# Patient Record
Sex: FEMALE | Race: WHITE | NOT HISPANIC OR LATINO | Employment: FULL TIME | URBAN - METROPOLITAN AREA
[De-identification: names, ages, dates, MRNs, and addresses within clinical notes are randomized per-mention and may not be internally consistent; named-entity substitution may affect disease eponyms.]

---

## 2020-11-23 ENCOUNTER — OFFICE VISIT (OUTPATIENT)
Dept: OBGYN | Facility: CLINIC | Age: 30
End: 2020-11-23
Payer: COMMERCIAL

## 2020-11-23 VITALS — SYSTOLIC BLOOD PRESSURE: 110 MMHG | WEIGHT: 145 LBS | DIASTOLIC BLOOD PRESSURE: 68 MMHG

## 2020-11-23 DIAGNOSIS — R30.0 DYSURIA: ICD-10-CM

## 2020-11-23 DIAGNOSIS — B37.31 YEAST INFECTION OF THE VAGINA: ICD-10-CM

## 2020-11-23 DIAGNOSIS — N30.01 ACUTE CYSTITIS WITH HEMATURIA: Primary | ICD-10-CM

## 2020-11-23 LAB
ALBUMIN UR-MCNC: 30 MG/DL
APPEARANCE UR: CLEAR
BACTERIA #/AREA URNS HPF: ABNORMAL /HPF
BILIRUB UR QL STRIP: NEGATIVE
COLOR UR AUTO: ABNORMAL
GLUCOSE UR STRIP-MCNC: NEGATIVE MG/DL
HGB UR QL STRIP: ABNORMAL
KETONES UR STRIP-MCNC: NEGATIVE MG/DL
LEUKOCYTE ESTERASE UR QL STRIP: ABNORMAL
NITRATE UR QL: NEGATIVE
NON-SQ EPI CELLS #/AREA URNS LPF: ABNORMAL /LPF
PH UR STRIP: 6.5 PH (ref 5–7)
RBC #/AREA URNS AUTO: ABNORMAL /HPF
SOURCE: ABNORMAL
SP GR UR STRIP: 1.01 (ref 1–1.03)
UROBILINOGEN UR STRIP-ACNC: 0.2 EU/DL (ref 0.2–1)
WBC #/AREA URNS AUTO: ABNORMAL /HPF

## 2020-11-23 PROCEDURE — 87088 URINE BACTERIA CULTURE: CPT | Performed by: ADVANCED PRACTICE MIDWIFE

## 2020-11-23 PROCEDURE — 81001 URINALYSIS AUTO W/SCOPE: CPT | Performed by: ADVANCED PRACTICE MIDWIFE

## 2020-11-23 PROCEDURE — 87086 URINE CULTURE/COLONY COUNT: CPT | Performed by: ADVANCED PRACTICE MIDWIFE

## 2020-11-23 PROCEDURE — 99203 OFFICE O/P NEW LOW 30 MIN: CPT | Performed by: ADVANCED PRACTICE MIDWIFE

## 2020-11-23 RX ORDER — PHENAZOPYRIDINE HYDROCHLORIDE 200 MG/1
200 TABLET, FILM COATED ORAL 3 TIMES DAILY PRN
Qty: 6 TABLET | Refills: 0 | Status: SHIPPED | OUTPATIENT
Start: 2020-11-23 | End: 2021-04-08

## 2020-11-23 RX ORDER — NORGESTIMATE AND ETHINYL ESTRADIOL 0.25-0.035
1 KIT ORAL
COMMUNITY
Start: 2020-10-08 | End: 2021-12-15

## 2020-11-23 RX ORDER — CIPROFLOXACIN 500 MG/1
500 TABLET, FILM COATED ORAL 2 TIMES DAILY
Qty: 14 TABLET | Refills: 0 | Status: SHIPPED | OUTPATIENT
Start: 2020-11-23 | End: 2021-04-08

## 2020-11-23 RX ORDER — HYDROXYZINE PAMOATE 25 MG/1
CAPSULE ORAL
COMMUNITY
Start: 2020-10-08 | End: 2022-03-17

## 2020-11-23 RX ORDER — FLUCONAZOLE 150 MG/1
150 TABLET ORAL ONCE
Qty: 1 TABLET | Refills: 0 | Status: SHIPPED | OUTPATIENT
Start: 2020-11-23 | End: 2020-11-23

## 2020-11-23 RX ORDER — CITALOPRAM HYDROBROMIDE 10 MG/1
TABLET ORAL
COMMUNITY
Start: 2020-10-08 | End: 2021-12-15

## 2020-11-23 SDOH — HEALTH STABILITY: MENTAL HEALTH: HOW OFTEN DO YOU HAVE 6 OR MORE DRINKS ON ONE OCCASION?: NOT ASKED

## 2020-11-23 SDOH — HEALTH STABILITY: MENTAL HEALTH: HOW OFTEN DO YOU HAVE A DRINK CONTAINING ALCOHOL?: NOT ASKED

## 2020-11-23 SDOH — HEALTH STABILITY: MENTAL HEALTH: HOW MANY STANDARD DRINKS CONTAINING ALCOHOL DO YOU HAVE ON A TYPICAL DAY?: NOT ASKED

## 2020-11-23 NOTE — PROGRESS NOTES
"Chief Complaint: painful urination    HPI: Edna noticed painful urination and urgency starting yesterday, but more marked today.  Expresses feeling like she needs to urinate frequently and also notes pain with each void, to the point where she sometimes feels nauseated.  Also having diarrhea with each void.  Notes hx of IBS and \"bowel issues\".  Denies vomiting.  Took temperature at home and was not febrile.  Denies back pain.  Did feel cold this morning, but better now.  She took a shot of cranberry juice from the pharmacy today, has been drinking lots of water, and also purchased a strip from the pharmacy that assesses for UTI and it was positive.  She had a UTI about 3 weeks ago and was empirically treated with Keflex.  UA and UC were not done as it was a tele health visit.  Notes she seems to get UTI after IC.  Reviewed urinating after IC and washes well.  We briefly discussed that some women take prophylactic abx with each act of IC, some also take cranberry pills and probiotic to help prevent UTI.  LMP is 2020, so hard to tell if there is blood in the urine.    Review of Systems - Negative except noted in HPI      Patient's last menstrual period was 2020.  OB History    Para Term  AB Living   0 0 0 0 0 0   SAB TAB Ectopic Multiple Live Births   0 0 0 0 0     Past Medical History:   Diagnosis Date     Anxiety and depression      Irritable bowel syndrome with diarrhea      Past Surgical History:   Procedure Laterality Date     NO HISTORY OF SURGERY       Family History   Problem Relation Age of Onset     No Known Problems Mother      No Known Problems Father      Crohn's Disease Sister      No Known Problems Brother      Heart Disease Maternal Grandmother      Pancreatic Cancer Paternal Grandmother      Emphysema Paternal Grandfather      Social History     Socioeconomic History     Marital status: Single     Spouse name: Not on file     Number of children: Not on file     Years of " education: Not on file     Highest education level: Not on file   Occupational History     Not on file   Social Needs     Financial resource strain: Not on file     Food insecurity     Worry: Not on file     Inability: Not on file     Transportation needs     Medical: Not on file     Non-medical: Not on file   Tobacco Use     Smoking status: Never Smoker     Smokeless tobacco: Never Used   Substance and Sexual Activity     Alcohol use: Yes     Comment: socially     Drug use: Never     Sexual activity: Yes     Partners: Male     Birth control/protection: Pill   Lifestyle     Physical activity     Days per week: Not on file     Minutes per session: Not on file     Stress: Not on file   Relationships     Social connections     Talks on phone: Not on file     Gets together: Not on file     Attends Congregational service: Not on file     Active member of club or organization: Not on file     Attends meetings of clubs or organizations: Not on file     Relationship status: Not on file     Intimate partner violence     Fear of current or ex partner: Not on file     Emotionally abused: Not on file     Physically abused: Not on file     Forced sexual activity: Not on file   Other Topics Concern     Not on file   Social History Narrative     Not on file       Current Outpatient Medications:      ciprofloxacin (CIPRO) 500 MG tablet, Take 1 tablet (500 mg) by mouth 2 times daily, Disp: 14 tablet, Rfl: 0     citalopram (CELEXA) 10 MG tablet, TK 1 T PO EACH MORNING, Disp: , Rfl:      fluconazole (DIFLUCAN) 150 MG tablet, Take 1 tablet (150 mg) by mouth once for 1 dose, Disp: 1 tablet, Rfl: 0     hydrOXYzine (VISTARIL) 25 MG capsule, Take 1-2 capsules by mouth up to three times a day as needed for anxiety, Disp: , Rfl:      norgestimate-ethinyl estradiol (ORTHO-CYCLEN) 0.25-35 MG-MCG tablet, Take 1 tablet by mouth, Disp: , Rfl:      phenazopyridine (PYRIDIUM) 200 MG tablet, Take 1 tablet (200 mg) by mouth 3 times daily as needed for  irritation, Disp: 6 tablet, Rfl: 0  Allergies   Allergen Reactions     Erythromycin Rash     As a child (unsure if it was azithromycin or erythromycin)         Objective:  Exam:  Constitutional: healthy, alert, no distress and cooperative  Head: Normocephalic. No masses, lesions, tenderness or abnormalities  Cardiovascular: PMI normal. No lifts, heaves, or thrills. RRR. No murmurs, clicks gallops or rub  Respiratory: negative findings: normal respiratory rate and rhythm, lungs clear to auscultation  Gastrointestinal: Abdomen soft, non-tender. BS normal. No masses, organomegaly  : mild suprapubic tenderness  Musculoskeletal: extremities normal- no gross deformities noted and gait normal  Neurologic: negative findings: cranial nerves 2-12 intact  Psychiatric: mentation appears normal and affect normal/bright      Assessment:  Acute cystitis    Plan:  UA/UC  Ciprofloxacin 500mg BID x 7 days    Edna Grimm CNM

## 2020-11-23 NOTE — NURSING NOTE
Chief Complaint   Patient presents with     UTI     x3wks, dysuria, frequency       Initial /68 (BP Location: Left arm, Cuff Size: Adult Regular)   Wt 65.8 kg (145 lb)   LMP 2020  There is no height or weight on file to calculate BMI.  BP completed using cuff size: regular    Questioned patient about current smoking habits.  Pt. has never smoked.        Britni Church, CMA

## 2020-11-26 LAB
BACTERIA SPEC CULT: ABNORMAL
BACTERIA SPEC CULT: ABNORMAL
Lab: ABNORMAL
SPECIMEN SOURCE: ABNORMAL

## 2020-11-27 ENCOUNTER — TELEPHONE (OUTPATIENT)
Facility: CLINIC | Age: 30
End: 2020-11-27

## 2020-11-27 DIAGNOSIS — R82.71 GBS BACTERIURIA: Primary | ICD-10-CM

## 2020-11-27 RX ORDER — AMOXICILLIN 500 MG/1
500 CAPSULE ORAL 3 TIMES DAILY
Qty: 15 CAPSULE | Refills: 0 | Status: SHIPPED | OUTPATIENT
Start: 2020-11-27 | End: 2021-04-08

## 2020-11-27 NOTE — TELEPHONE ENCOUNTER
Please notified Edna that I have sent in a prescription for a medication that will better treat GBS. Thanks.     Copied from routing comment

## 2020-12-17 ENCOUNTER — TELEPHONE (OUTPATIENT)
Facility: CLINIC | Age: 30
End: 2020-12-17

## 2020-12-17 DIAGNOSIS — B96.89 BACTERIAL VAGINITIS: Primary | ICD-10-CM

## 2020-12-17 DIAGNOSIS — R82.71 GBS BACTERIURIA: ICD-10-CM

## 2020-12-17 DIAGNOSIS — N76.0 BACTERIAL VAGINITIS: Primary | ICD-10-CM

## 2020-12-17 LAB
ALBUMIN UR-MCNC: NEGATIVE MG/DL
APPEARANCE UR: CLEAR
BILIRUB UR QL STRIP: NEGATIVE
COLOR UR AUTO: YELLOW
GLUCOSE UR STRIP-MCNC: NEGATIVE MG/DL
HGB UR QL STRIP: NEGATIVE
KETONES UR STRIP-MCNC: NEGATIVE MG/DL
LEUKOCYTE ESTERASE UR QL STRIP: NEGATIVE
NITRATE UR QL: NEGATIVE
PH UR STRIP: 7 PH (ref 5–7)
RBC #/AREA URNS AUTO: NORMAL /HPF
SOURCE: NORMAL
SP GR UR STRIP: 1.02 (ref 1–1.03)
SPECIMEN SOURCE: ABNORMAL
UROBILINOGEN UR STRIP-ACNC: 0.2 EU/DL (ref 0.2–1)
WBC #/AREA URNS AUTO: NORMAL /HPF
WET PREP SPEC: ABNORMAL

## 2020-12-17 PROCEDURE — 87210 SMEAR WET MOUNT SALINE/INK: CPT | Performed by: ADVANCED PRACTICE MIDWIFE

## 2020-12-17 PROCEDURE — 87086 URINE CULTURE/COLONY COUNT: CPT | Performed by: ADVANCED PRACTICE MIDWIFE

## 2020-12-17 PROCEDURE — 81001 URINALYSIS AUTO W/SCOPE: CPT | Performed by: ADVANCED PRACTICE MIDWIFE

## 2020-12-17 NOTE — TELEPHONE ENCOUNTER
Discussed with Sonja.  UA with UC and self swab wet prep ordered.    Pt advised.  Scheduled.    Tianna Burgos RN

## 2020-12-17 NOTE — TELEPHONE ENCOUNTER
"Pt calling regarding her recent UTI (saw Edna on 11/23).  She took abx as Rx'd.  Culture showed GBS.  Still noting a slight increase in frequency with urination.  Also states vaginal area feels \"off.\"    States her partner was also seen after her symptoms and the provider mentioned something about \"trich.\"    Wants to be seen, but no openings tomorrow.  Can we have her do lab only for UA and self swab wet prep?    Tianna Burgos RN    "

## 2020-12-18 LAB
BACTERIA SPEC CULT: NO GROWTH
Lab: NORMAL
SPECIMEN SOURCE: NORMAL

## 2020-12-18 RX ORDER — METRONIDAZOLE 7.5 MG/G
1 GEL VAGINAL DAILY
Qty: 25 G | Refills: 0 | Status: SHIPPED | OUTPATIENT
Start: 2020-12-18 | End: 2020-12-23

## 2020-12-18 NOTE — TELEPHONE ENCOUNTER
Called and informed about wet prep + for clue cells.  Reviewed options for treatment and she chooses metronidazole gel.  Discussed no alcohol use during medication administration and to either avoid intercourse, or use condoms while being treated.  She mentions that her boyfriend was seen in clinic and had a culture.  Said he is being treated for trichomoniasis, though he did not test positive for it.  We discussed that trichomoniasis is an STD, but she is negative for this.  Prescription sent to preferred pharmacy.    Edna Grimm CNM

## 2020-12-18 NOTE — TELEPHONE ENCOUNTER
Pt calling today for results.  Please see previous note in this thread from yesterday.  Please see UA and wet prep that was ordered under Sonja yesterday.    Tianna Burgos RN

## 2021-01-09 ENCOUNTER — HEALTH MAINTENANCE LETTER (OUTPATIENT)
Age: 31
End: 2021-01-09

## 2021-04-08 ENCOUNTER — OFFICE VISIT (OUTPATIENT)
Dept: OBGYN | Facility: CLINIC | Age: 31
End: 2021-04-08
Payer: COMMERCIAL

## 2021-04-08 VITALS
SYSTOLIC BLOOD PRESSURE: 100 MMHG | DIASTOLIC BLOOD PRESSURE: 68 MMHG | WEIGHT: 159 LBS | BODY MASS INDEX: 26.49 KG/M2 | HEIGHT: 65 IN

## 2021-04-08 DIAGNOSIS — N76.0 BACTERIAL VAGINOSIS: ICD-10-CM

## 2021-04-08 DIAGNOSIS — N86 CERVICAL ECTROPION: ICD-10-CM

## 2021-04-08 DIAGNOSIS — N93.0 BLEEDING AFTER INTERCOURSE: Primary | ICD-10-CM

## 2021-04-08 DIAGNOSIS — B96.89 BACTERIAL VAGINOSIS: ICD-10-CM

## 2021-04-08 DIAGNOSIS — B37.31 CANDIDIASIS OF VAGINA: ICD-10-CM

## 2021-04-08 DIAGNOSIS — Z83.49 FAMILY HISTORY OF THYROID PROBLEM: ICD-10-CM

## 2021-04-08 LAB
HCG UR QL: NEGATIVE
SPECIMEN SOURCE: ABNORMAL
TSH SERPL DL<=0.005 MIU/L-ACNC: 1.2 MU/L (ref 0.4–4)
WET PREP SPEC: ABNORMAL

## 2021-04-08 PROCEDURE — 99213 OFFICE O/P EST LOW 20 MIN: CPT | Performed by: ADVANCED PRACTICE MIDWIFE

## 2021-04-08 PROCEDURE — 87591 N.GONORRHOEAE DNA AMP PROB: CPT | Performed by: ADVANCED PRACTICE MIDWIFE

## 2021-04-08 PROCEDURE — 87491 CHLMYD TRACH DNA AMP PROBE: CPT | Performed by: ADVANCED PRACTICE MIDWIFE

## 2021-04-08 PROCEDURE — 87210 SMEAR WET MOUNT SALINE/INK: CPT | Performed by: ADVANCED PRACTICE MIDWIFE

## 2021-04-08 PROCEDURE — 36415 COLL VENOUS BLD VENIPUNCTURE: CPT | Performed by: ADVANCED PRACTICE MIDWIFE

## 2021-04-08 PROCEDURE — 81025 URINE PREGNANCY TEST: CPT | Performed by: ADVANCED PRACTICE MIDWIFE

## 2021-04-08 PROCEDURE — 84443 ASSAY THYROID STIM HORMONE: CPT | Performed by: ADVANCED PRACTICE MIDWIFE

## 2021-04-08 RX ORDER — FLUCONAZOLE 150 MG/1
150 TABLET ORAL ONCE
Qty: 1 TABLET | Refills: 0 | Status: SHIPPED | OUTPATIENT
Start: 2021-04-08 | End: 2021-04-08

## 2021-04-08 RX ORDER — METRONIDAZOLE 7.5 MG/G
1 GEL VAGINAL DAILY
Qty: 25 G | Refills: 0 | Status: SHIPPED | OUTPATIENT
Start: 2021-04-08 | End: 2021-04-13

## 2021-04-08 ASSESSMENT — MIFFLIN-ST. JEOR: SCORE: 1432.1

## 2021-04-08 NOTE — PATIENT INSTRUCTIONS
"?Noninfectious - Noninfectious causes are numerous (table 1). Gynecologic etiologies are the most common causes of postcoital bleeding, and include [4,5]:    Cervical ectropion - 34 percent.   Cervical ectropion is the presence of columnar epithelium on the ectocervix and is commonly found in postpubertal adolescents, pregnant individuals, and those using estrogen-containing contraceptives (eg, oral estrogen-progestin pills, vaginal ring, or transdermal patch) (picture 1) [6]. (See \"Benign cervical lesions and congenital anomalies of the cervix\", section on 'Ectropion'.)    "

## 2021-04-08 NOTE — NURSING NOTE
"Chief Complaint   Patient presents with     Vaginal Bleeding     bleeding with IC       Initial /68 (BP Location: Left arm, Patient Position: Chair, Cuff Size: Adult Regular)   Ht 1.651 m (5' 5\")   Wt 72.1 kg (159 lb)   LMP 2021 (Approximate)   Breastfeeding No   BMI 26.46 kg/m   Estimated body mass index is 26.46 kg/m  as calculated from the following:    Height as of this encounter: 1.651 m (5' 5\").    Weight as of this encounter: 72.1 kg (159 lb).  BP completed using cuff size: regular    Questioned patient about current smoking habits.  Pt. has never smoked.          The following HM Due: NONE  Jolly Skinner CMA    "

## 2021-04-08 NOTE — PROGRESS NOTES
upt    SUBJECTIVE:                                                   Edna Ayala is a 30 year old who presents to clinic today for the following health issue(s):  Patient presents with:  Vaginal Bleeding: bleeding with IC      HPI:  Bleeding with intercourse x2-3 wwks  Reports bleeding is bright red, similar to period blood   Light tampon's worth of bleeding immediately after intercourse   Reports she has been on ortho-cyclen since 9th grade.;  She denies any new sexual partners or concerns for STDs. She has been with her current partner x1 yr.   She also reports she desires to have her thyroid level checked due to a family history of thyroid abnormalities.     Patient's last menstrual period was 2021 (approximate).  Menstrual History: frequency: every 28 days  Patient is sexually active  .  Using oral contraceptives for contraception.     STI infx testing offered:  Accepted    Last PHQ-9 score on record = No flowsheet data found.  Last GAD7 score on record = No flowsheet data found.  Alcohol Score = 0    Problem list and histories reviewed & adjusted, as indicated.  Additional history: as documented.    There is no problem list on file for this patient.    Past Surgical History:   Procedure Laterality Date     NO HISTORY OF SURGERY        Social History     Tobacco Use     Smoking status: Never Smoker     Smokeless tobacco: Never Used   Substance Use Topics     Alcohol use: Yes     Comment: socially      Problem (# of Occurrences) Relation (Name,Age of Onset)    Crohn's Disease (1) Sister    Emphysema (1) Paternal Grandfather    Heart Disease (1) Maternal Grandmother    No Known Problems (3) Mother, Father, Brother    Pancreatic Cancer (1) Paternal Grandmother            Current Outpatient Medications   Medication Sig     citalopram (CELEXA) 10 MG tablet TK 1 T PO EACH MORNING     fluconazole (DIFLUCAN) 150 MG tablet Take 1 tablet (150 mg) by mouth once for 1 dose     hydrOXYzine (VISTARIL) 25 MG  "capsule Take 1-2 capsules by mouth up to three times a day as needed for anxiety     metroNIDAZOLE (METROGEL) 0.75 % vaginal gel Place 1 applicator (5 g) vaginally daily for 5 days     norgestimate-ethinyl estradiol (ORTHO-CYCLEN) 0.25-35 MG-MCG tablet Take 1 tablet by mouth     No current facility-administered medications for this visit.      Allergies   Allergen Reactions     Erythromycin Rash     As a child (unsure if it was azithromycin or erythromycin)         ROS:  CONSTITUTIONAL: NEGATIVE for fever, chills, change in weight  INTEGUMENTARU/SKIN: NEGATIVE for worrisome rashes, moles or lesions  EYES: NEGATIVE for vision changes or irritation  ENT: NEGATIVE for ear, mouth and throat problems  RESP: NEGATIVE for significant cough or SOB  BREAST: NEGATIVE for masses, tenderness or discharge  CV: NEGATIVE for chest pain, palpitations or peripheral edema  GI: NEGATIVE for nausea, abdominal pain, heartburn, or change in bowel habits  : NEGATIVE for unusual urinary.. Periods are regular. Positive for bleeding with intercourse.   MUSCULOSKELETAL: NEGATIVE for significant arthralgias or myalgia  NEURO: NEGATIVE for weakness, dizziness or paresthesias  PSYCHIATRIC: NEGATIVE for changes in mood or affect    OBJECTIVE:     /68 (BP Location: Left arm, Patient Position: Chair, Cuff Size: Adult Regular)   Ht 1.651 m (5' 5\")   Wt 72.1 kg (159 lb)   LMP 03/16/2021 (Approximate)   Breastfeeding No   BMI 26.46 kg/m    Body mass index is 26.46 kg/m .    PHYSICAL EXAM:  Constitutional:  Appearance: Well nourished, well developed alert, in no acute distress  Skin: General Inspection:  No rashes present, no lesions present, no areas of discoloration.  Neurologic:  Mental Status:  Oriented X3.  Normal strength and tone, sensory exam grossly normal, mentation intact and speech normal.    Psychiatric:  Mentation appears normal and affect normal/bright.  Pelvic Exam:  External Genitalia:     Normal appearance for age, no " discharge present, no tenderness present, no inflammatory lesions present, color normal  Vagina:     Normal vaginal vault without central or paravaginal defects, no discharge present, no inflammatory lesions present, no masses present  Cervix:     Appearance healthy, no lesions present, nontender to palpation, no bleeding present   Cervical ectropion present, scant amount of blood noted with q-tip test  Uterus:     Uterus: firm, normal sized and nontender  Adnexa:     No adnexal tenderness present, no adnexal masses present  Perineum:     Perineum within normal limits, no evidence of trauma, no rashes or skin lesions present  Anus:     Anus within normal limits, no hemorrhoids present  Inguinal Lymph Nodes:     No lymphadenopathy present  Pubic Hair:     Normal pubic hair distribution for age  Genitalia and Groin:     No rashes present, no lesions present, no areas of discoloration, no masses present       In-Clinic Test Results:  Results for orders placed or performed in visit on 04/08/21 (from the past 24 hour(s))   Wet prep    Specimen: Vagina   Result Value Ref Range    Specimen Description Vagina     Wet Prep No Trichomonas seen     Wet Prep Clue cells seen  Many   (A)     Wet Prep Yeast seen  Few   (A)     Wet Prep WBC'S seen  Many      HCG Qual, Urine ([id number])   Result Value Ref Range    HCG Qual Urine Negative NEG^Negative       ASSESSMENT/PLAN:                                                      1. (N93.0) Bleeding after intercourse  (primary encounter diagnosis)  Plan: US Pelvic Complete with Transvaginal, HCG Qual,        Urine ([id number]), Wet prep, NEISSERIA GONORRHOEA        PCR, CHLAMYDIA TRACHOMATIS PCR        2. (Z83.49) Family history of thyroid problem  Plan: TSH with free T4 reflex         3. (N76.0,  B96.89) Bacterial vaginosis  Plan: metroNIDAZOLE (METROGEL) 0.75 % vaginal gel  - Wet prep pos for clue cells  - Discussed this could be the cause of her bleeding with intercourse  - Treat with  metrogel  - Of note, pt was treated for BV in 12/2020 with metrogel with good relief  - We discussed causes of BV and how to prevent recurrence. Encouraged probiotic use, using condoms during treatment.   - Can try vaginal boric acid 600mg nightly x30 nights after she is done with her Flagyl  - Discussed option for suppressive therapy if she has another documented case of BV this year    4. (B37.3) Candidiasis of vagina  Plan: fluconazole (DIFLUCAN) 150 MG tablet  - Wet prep pos for yeast    5. (N86) Cervical ectropion  - Discussed cervical ectropion is common among VERNON users and is benign in nature, but is a common cause of bleeding with intercourse  - Discussed it often resolves after switching to a progesterone-only BC method    Consider pelvic US if bleeding does not resolve after BV/Yeast are treated, although I do think the likely cause is cervical ectropion    ADAM Carson, CNM

## 2021-04-09 LAB
C TRACH DNA SPEC QL NAA+PROBE: NEGATIVE
N GONORRHOEA DNA SPEC QL NAA+PROBE: NEGATIVE
SPECIMEN SOURCE: NORMAL
SPECIMEN SOURCE: NORMAL

## 2021-10-11 ENCOUNTER — HEALTH MAINTENANCE LETTER (OUTPATIENT)
Age: 31
End: 2021-10-11

## 2021-12-15 ENCOUNTER — OFFICE VISIT (OUTPATIENT)
Dept: FAMILY MEDICINE | Facility: CLINIC | Age: 31
End: 2021-12-15
Payer: COMMERCIAL

## 2021-12-15 VITALS
RESPIRATION RATE: 14 BRPM | DIASTOLIC BLOOD PRESSURE: 68 MMHG | HEIGHT: 65 IN | BODY MASS INDEX: 26.49 KG/M2 | WEIGHT: 159 LBS | OXYGEN SATURATION: 99 % | TEMPERATURE: 98.1 F | HEART RATE: 58 BPM | SYSTOLIC BLOOD PRESSURE: 104 MMHG

## 2021-12-15 DIAGNOSIS — N89.8 VAGINAL ODOR: Primary | ICD-10-CM

## 2021-12-15 DIAGNOSIS — N89.8 VAGINAL DISCHARGE: ICD-10-CM

## 2021-12-15 LAB
CLUE CELLS: ABNORMAL
TRICHOMONAS, WET PREP: ABNORMAL
WBC'S/HIGH POWER FIELD, WET PREP: ABNORMAL
YEAST, WET PREP: ABNORMAL

## 2021-12-15 PROCEDURE — 99203 OFFICE O/P NEW LOW 30 MIN: CPT | Performed by: PHYSICIAN ASSISTANT

## 2021-12-15 PROCEDURE — 87210 SMEAR WET MOUNT SALINE/INK: CPT | Performed by: PHYSICIAN ASSISTANT

## 2021-12-15 RX ORDER — CLINDAMYCIN PHOSPHATE 10 MG/G
GEL TOPICAL 2 TIMES DAILY
COMMUNITY
Start: 2021-10-19 | End: 2022-03-17

## 2021-12-15 RX ORDER — METRONIDAZOLE 7.5 MG/G
GEL VAGINAL
COMMUNITY
Start: 2021-10-19 | End: 2022-03-17

## 2021-12-15 ASSESSMENT — MIFFLIN-ST. JEOR: SCORE: 1427.1

## 2021-12-15 NOTE — PROGRESS NOTES
"  Assessment & Plan     Diagnoses and all orders for this visit:    Vaginal odor  -     Wet prep - Clinic Collect    Vaginal discharge  -     Wet prep - Clinic Collect      32 yo female with recurrent hx of BV previously tried on oral and vaginal therapy. Recently seen by PCP for same and advised to use metrogel 2x weekly for next few months for prevention. Did notice initial improvement, but stopped after 2 weeks as wondering if there's alternative options that don't include persistent vaginal medication. Reviewed with her that I agree with previous provider's assessment, but that vaginal or oral clindamycin would be alternative options. Risks of systemic abx therapy reviewed including possibility of c diff. Did repeat her wet prep today due to possible early recurrence of symptoms, but not \"full on symptoms\" and will notify her via [employer] of results once available. Encouraged to consult with OB//GYN specialist moving forward as well. Patient in agreement with plan.     Return in about 1 month (around 1/15/2022) for ob/gyn.    CARLOS MANUEL Smith [facility]    Issac Bishop is a 31 year old who presents for the following health issues:    Daughter of one of our wonderful Midwife colleagues.    History of Present Illness       She eats 2-3 servings of fruits and vegetables daily.She consumes 0 sweetened beverage(s) daily.She exercises with enough effort to increase her heart rate 10 to 19 minutes per day.  She exercises with enough effort to increase her heart rate 3 or less days per week.   She is taking medications regularly.     Vaginal Symptoms - hx of recurrent BV x1 yr. Estimates at least 6 episodes. Feels likely has had it more than this, but just deals with it. Feels \"acidic\", has odor, some itching and sometimes pain during intercourse. Has also had bleeding during intercourse as well. Saw her primary care provider regarding this and one of the clinical nurse midwife for " "this who was a fill in at her mom's clinic prior to this.  Was prescribed 7 day course or oral metronidazole followed by vaginal metronidazole 2x weekly for prevention for several months. Has been using this, but wanted a second opinion as \"makes me feel gross\" and wonders if there are alternative options. Did this for 2 weeks then quit.   Doesn't feel like it's \"full on symptoms\" right now, but has milder symptoms that makes her feel like it could be coming back.     Not on contraception for the past 6 months. Not trying to conceive, but has a partner that is significantly older than her so is ok with the possibility of pregnancy. Dating for a year if not more. \"Would be fine if it happened\".  Both special Ed     Onset/Duration: previously seen by multiple providers - has had antibiotics   Description:  Vaginal Discharge: white clear   Itching (Pruritis): YES  Burning sensation:  no  Odor: YES  Accompanying Signs & Symptoms:  Urinary symptoms: no  Abdominal pain: no  Fever: no  History:   Sexually active: YES  New Partner: no  Possibility of Pregnancy:  Would like to discuss fertility - has not been trying to conceive. No use of ovulation kits. Previously normal TSH screening this year.  Recent antibiotic use: YES-  For BV     Current Outpatient Medications   Medication     aluminum chloride (DRYSOL) 20 % external solution     clindamycin (CLINDAMAX) 1 % external gel     metroNIDAZOLE (METROGEL) 0.75 % vaginal gel     hydrOXYzine (VISTARIL) 25 MG capsule     No current facility-administered medications for this visit.        Allergies   Allergen Reactions     Erythromycin Rash     As a child (unsure if it was azithromycin or erythromycin)         Review of Systems   Constitutional, HEENT, cardiovascular, pulmonary, gi and gu systems are negative, except as otherwise noted.      Objective    /68   Pulse 58   Temp 98.1  F (36.7  C) (Tympanic)   Resp 14   Ht 1.651 m (5' 5\")   Wt 72.1 kg (159 lb)   LMP " 12/07/2021   SpO2 99%   BMI 26.46 kg/m    Body mass index is 26.46 kg/m .  Physical Exam   GENERAL: healthy, alert and no distress  RESP: lungs clear to auscultation - no rales, rhonchi or wheezes  CV: regular rate and rhythm, normal S1 S2, no S3 or S4, no murmur, click or rub, no peripheral edema and peripheral pulses strong  ABDOMEN: soft, nontender, no hepatosplenomegaly, no masses and bowel sounds normal   (female): normal female external genitalia, normal urethral meatus, vaginal mucosa, normal cervix/adnexa/uterus without masses. Perhaps minimal clear to white discharge, but no apparent malodor.

## 2021-12-16 NOTE — RESULT ENCOUNTER NOTE
Dear Edna,      Your recent test results are noted below:    Wet prep was negative :) As you stated, perhaps you don't have a true episode. If it recurs, can consider clindamycin or following up with OB/GYN as we discussed.    For additional lab test information, labtestsonline.org is an excellent reference. Please contact the clinic at [phone] with any further questions or concerns.    Sincerely,      Allyn Asher PA-C  [facility]

## 2022-01-30 ENCOUNTER — HEALTH MAINTENANCE LETTER (OUTPATIENT)
Age: 32
End: 2022-01-30

## 2022-03-16 ENCOUNTER — TELEPHONE (OUTPATIENT)
Dept: FAMILY MEDICINE | Facility: CLINIC | Age: 32
End: 2022-03-16
Payer: COMMERCIAL

## 2022-03-17 ENCOUNTER — PRENATAL OFFICE VISIT (OUTPATIENT)
Dept: NURSING | Facility: CLINIC | Age: 32
End: 2022-03-17
Payer: COMMERCIAL

## 2022-03-17 VITALS — WEIGHT: 163 LBS | HEIGHT: 65 IN | BODY MASS INDEX: 27.16 KG/M2

## 2022-03-17 DIAGNOSIS — O36.80X0 PREGNANCY WITH INCONCLUSIVE FETAL VIABILITY: Primary | ICD-10-CM

## 2022-03-17 DIAGNOSIS — Z13.79 GENETIC SCREENING: ICD-10-CM

## 2022-03-17 DIAGNOSIS — O09.91 SUPERVISION OF HIGH RISK PREGNANCY IN FIRST TRIMESTER: ICD-10-CM

## 2022-03-17 PROBLEM — O09.90 SUPERVISION OF HIGH-RISK PREGNANCY: Status: ACTIVE | Noted: 2022-03-17

## 2022-03-17 PROCEDURE — 99207 PR NO CHARGE NURSE ONLY: CPT

## 2022-03-17 RX ORDER — PYRIDOXINE HCL (VITAMIN B6) 50 MG
50 TABLET ORAL DAILY
COMMUNITY
End: 2022-04-11

## 2022-03-17 NOTE — PROGRESS NOTES
SUBJECTIVE:     HPI:    This is a 31 year old female patient,  who presents for her first obstetrical visit.    JASMIN: 10/13/2022, by Last Menstrual Period.  She is 10w0d weeks.  Her cycles are irregular.  Her last menstrual period was light spotting.   Since her LMP, she has experienced  nausea.    Additional History: first pregnancy    Have you travelled during the pregnancy?No  Have your sexual partner(s) travelled during the pregnancy?No    HISTORY:   Planned Pregnancy: No  Marital Status: Single  Occupation: Teacher  Living in Household: Alone    Past History:  Her past medical history   Past Medical History:   Diagnosis Date     Anxiety and depression      Generalized hyperhidrosis      Irritable bowel syndrome with diarrhea    .      She has a history of  anx/dep-off meds for approx 6 month, IBS    Since her last LMP she denies use of alcohol, tobacco and street drugs.    Past medical, surgical, social and family history were reviewed and updated in [facility].      Current Outpatient Medications   Medication     doxylamine (UNISOM) 25 MG TABS tablet     Prenat w/o [id number] (PNV-DHA PO)     pyridOXINE (VITAMIN B-6) 50 MG tablet     No current facility-administered medications for this visit.       ROS:   12 point review of systems negative other than symptoms noted below or in the HPI.  Gastrointestinal: Nausea    Nurse phone visit completed. Prenatal book and folder containing standard educational hand-outs and brochures will be given at the next visit to patient. Information in folder reviewed over the phone. Questions answered. Information given on optional screening available to assess chromosomal anomalies. Pt desires NIPS. Pt advised to call the clinic if she has any questions or concerns related to her pregnancy. Prenatal labs future ordered.  Bella Murray RN on 3/17/2022 at 2:35 PM        No results found for: PAP  PHQ-9 score:    PHQ 3/17/2022   PHQ-9 Total Score 6   Q9: Thoughts of  "better off dead/self-harm past 2 weeks Not at all               AZUL-7 SCORE 3/17/2022   Total Score 5 (mild anxiety)   Total Score 5             Patient supplied answers from flow sheet for:  Prenatal OB Questionnaire.  Past Medical History  Have you ever recieved care for your mental health? : (!) Yes  Have you ever been in a major accident or suffered serious trauma?: No  Within the last year, has anyone hit, slapped, kicked or otherwise hurt you?: No  In the last year, has anyone forced you to have sex when you didn't want to?: No    Past Medical History 2   Have you ever received a blood transfusion?: No  Would you accept a blood transfusion if was medically recommended?: Yes  Does anyone in your home smoke?: No   Is your blood type Rh negative?: No  Have you ever ?: No  Have you been hospitalized for a nonsurgical reason excluding normal delivery?: No  Have you ever had an abnormal pap smear?: No    Past Medical History (Continued)  Do you have a history of abnormalities of the uterus?: No  Did your mother take MELVI or any other hormones when she was pregnant with you?: No  Do you have any other problems we have not asked about which you feel may be important to this pregnancy?: No                   OBJECTIVE:     EXAM:  Ht 1.651 m (5' 5\")   Wt 73.9 kg (163 lb)   LMP 01/06/2022   BMI 27.12 kg/m   Body mass index is 27.12 kg/m .      "

## 2022-03-23 NOTE — PROGRESS NOTES
SUBJECTIVE:      HPI:     This is a 31 year old female patient,  who presents for her first obstetrical visit.     JASMIN: 10/13/2022, by Last Menstrual Period.  She is 10w0d weeks.  Her cycles are irregular.  Her last menstrual period was light spotting.   Since her LMP, she has experienced  nausea.     Additional History: first pregnancy     Have you travelled during the pregnancy?No  Have your sexual partner(s) travelled during the pregnancy?No     HISTORY:   Planned Pregnancy: No  Marital Status: Single  Occupation: Teacher  Living in Household: Alone     Past History:  Her past medical history   Past Medical History        Past Medical History:   Diagnosis Date     Anxiety and depression       Generalized hyperhidrosis       Irritable bowel syndrome with diarrhea        .       She has a history of  anx/dep-off meds for approx 6 month, IBS     Since her last LMP she denies use of alcohol, tobacco and street drugs.     Past medical, surgical, social and family history were reviewed and updated in EPIC.            Current Outpatient Medications   Medication     doxylamine (UNISOM) 25 MG TABS tablet     Prenat w/o R-AA-Qhcganw-FA-DHA (PNV-DHA PO)     pyridOXINE (VITAMIN B-6) 50 MG tablet      No current facility-administered medications for this visit.         ROS:   12 point review of systems negative other than symptoms noted below or in the HPI.  Gastrointestinal: Nausea     Nurse phone visit completed. Prenatal book and folder containing standard educational hand-outs and brochures will be given at the next visit to patient. Information in folder reviewed over the phone. Questions answered. Information given on optional screening available to assess chromosomal anomalies. Pt desires NIPS. Pt advised to call the clinic if she has any questions or concerns related to her pregnancy. Prenatal labs future ordered.  Bella Murray RN on 3/17/2022 at 2:35 PM           No results found for: PAP  PHQ-9 score:   "  PHQ 3/17/2022   PHQ-9 Total Score 6   Q9: Thoughts of better off dead/self-harm past 2 weeks Not at all                     AZUL-7 SCORE 3/17/2022   Total Score 5 (mild anxiety)   Total Score 5                  Patient supplied answers from flow sheet for:  Prenatal OB Questionnaire.  Past Medical History  Have you ever recieved care for your mental health? : (!) Yes  Have you ever been in a major accident or suffered serious trauma?: No  Within the last year, has anyone hit, slapped, kicked or otherwise hurt you?: No  In the last year, has anyone forced you to have sex when you didn't want to?: No     Past Medical History 2   Have you ever received a blood transfusion?: No  Would you accept a blood transfusion if was medically recommended?: Yes  Does anyone in your home smoke?: No   Is your blood type Rh negative?: No  Have you ever ?: No  Have you been hospitalized for a nonsurgical reason excluding normal delivery?: No  Have you ever had an abnormal pap smear?: No     Past Medical History (Continued)  Do you have a history of abnormalities of the uterus?: No  Did your mother take MELVI or any other hormones when she was pregnant with you?: No  Do you have any other problems we have not asked about which you feel may be important to this pregnancy?: No         OBJECTIVE:     EXAM:  LMP 01/06/2022  There is no height or weight on file to calculate BMI.    {female exam complete:[id number]::\"GENERAL: healthy, alert and no distress\",\"EYES: Eyes grossly normal to inspection, PERRL and conjunctivae and sclerae normal\",\"HENT: ear canals and TM's normal, nose and mouth without ulcers or lesions\",\"NECK: no adenopathy, no asymmetry, masses, or scars and thyroid normal to palpation\",\"RESP: lungs clear to auscultation - no rales, rhonchi or wheezes\",\"BREAST: normal without masses, tenderness or nipple discharge and no palpable axillary masses or adenopathy\",\"CV: regular rate and rhythm, normal S1 S2, no S3 or S4, no " "murmur, click or rub, no peripheral edema and peripheral pulses strong\",\"ABDOMEN: soft, nontender, no hepatosplenomegaly, no masses and bowel sounds normal\",\"MS: no gross musculoskeletal defects noted, no edema\",\"SKIN: no suspicious lesions or rashes\",\"NEURO: Normal strength and tone, mentation intact and speech normal\",\"PSYCH: mentation appears normal, affect normal/bright\"}    ASSESSMENT/PLAN:     No diagnosis found.    31 year old , On 2022 patient is 10w6d weeks of pregnancy with JASMIN of 10/13/2022, by Last Menstrual Period      COUNSELING    Instructed on use of triage nurse line and contacting the on call CNM after hours in an emergency.     Symptoms of N&V and fatigue usually start to resolve around 12-16 weeks     Reviewed CNM philosophy, call schedule for labor and delivery, and FSH for delivery    1st OB handout given outlining appointment spacing and CNM information    Reviewed exercise and nutrition    Recommend to gain 15-25 pounds with her pregnancy.    Discussed OTC medications. OB med list given    Encouraged patient to take PNV's/DHA    Flu and Covid vaccine discussed    Genetic screening    Will call patient with lab results when available      Will return to the clinic in {NUMBERS 1-6:6} weeks for her next routine prenatal check.  Will call to be seen sooner if problems arise.      Alyson Cannon, ADAM CNM  "

## 2022-03-24 ENCOUNTER — TELEPHONE (OUTPATIENT)
Dept: MIDWIFE SERVICES | Facility: CLINIC | Age: 32
End: 2022-03-24

## 2022-03-24 ENCOUNTER — ANCILLARY PROCEDURE (OUTPATIENT)
Dept: ULTRASOUND IMAGING | Facility: CLINIC | Age: 32
End: 2022-03-24
Payer: COMMERCIAL

## 2022-03-24 ENCOUNTER — PRENATAL OFFICE VISIT (OUTPATIENT)
Dept: MIDWIFE SERVICES | Facility: CLINIC | Age: 32
End: 2022-03-24
Payer: COMMERCIAL

## 2022-03-24 VITALS
WEIGHT: 153.8 LBS | HEIGHT: 65 IN | BODY MASS INDEX: 25.62 KG/M2 | DIASTOLIC BLOOD PRESSURE: 66 MMHG | SYSTOLIC BLOOD PRESSURE: 102 MMHG

## 2022-03-24 DIAGNOSIS — O36.80X0 PREGNANCY WITH INCONCLUSIVE FETAL VIABILITY, SINGLE OR UNSPECIFIED FETUS: ICD-10-CM

## 2022-03-24 DIAGNOSIS — O36.80X0 PREGNANCY WITH INCONCLUSIVE FETAL VIABILITY: ICD-10-CM

## 2022-03-24 DIAGNOSIS — Z34.91 ENCOUNTER FOR SUPERVISION OF LOW-RISK PREGNANCY IN FIRST TRIMESTER: Primary | ICD-10-CM

## 2022-03-24 PROCEDURE — 36415 COLL VENOUS BLD VENIPUNCTURE: CPT | Performed by: ADVANCED PRACTICE MIDWIFE

## 2022-03-24 PROCEDURE — 84702 CHORIONIC GONADOTROPIN TEST: CPT | Performed by: ADVANCED PRACTICE MIDWIFE

## 2022-03-24 PROCEDURE — 99214 OFFICE O/P EST MOD 30 MIN: CPT | Performed by: ADVANCED PRACTICE MIDWIFE

## 2022-03-24 PROCEDURE — 76817 TRANSVAGINAL US OBSTETRIC: CPT | Performed by: OBSTETRICS & GYNECOLOGY

## 2022-03-24 NOTE — PROGRESS NOTES
SUBJECTIVE:                                                   Edna Ayala is a 31 year old who presents to clinic today for the following health issue(s):  Patient presents with:  Ultrasound: US f/u for viability      HPI:  Edna is a  at 11w0d here today with her sister for a new prenatal visit. She has an US today at [facility] which indicated a 7w1d gestational age via CRL, with absent fetal heart beat. Edna has a history of irregular menses and is not confident in her dating. She had a positive pregnancy test on  and noted that conception could have occurred as late as  which is her last date of intercourse. She had been experiencing nausea up until 3 or 4 days ago, but has also been taking unisom/B6 for symptom relief. No cramping or spotting.      Patient's last menstrual period was 2022.  Menstrual History: frequency: every 28-30 days  Patient is sexually active  .  Using none for contraception.   Health maintenance updated:  yes  STI infx testing offered:  Declined    Last PHQ-9 score on record =   PHQ-9 SCORE 3/17/2022   PHQ-9 Total Score MyChart 6 (Mild depression)   PHQ-9 Total Score 6     Last GAD7 score on record =   AZUL-7 SCORE 3/17/2022   Total Score 5 (mild anxiety)   Total Score 5       Problem list and histories reviewed & adjusted, as indicated.  Additional history: as documented.    Patient Active Problem List   Diagnosis     Cervical ectropion     Supervision of high-risk pregnancy     Past Surgical History:   Procedure Laterality Date     NO HISTORY OF SURGERY        Social History     Tobacco Use     Smoking status: Never Smoker     Smokeless tobacco: Never Used   Substance Use Topics     Alcohol use: Not Currently     Comment: socially      Problem (# of Occurrences) Relation (Name,Age of Onset)    Crohn's Disease (1) Sister    Emphysema (1) Paternal Grandfather    Heart Disease (1) Maternal Grandmother    No Known Problems (3) Mother, Father, Brother  "   Pancreatic Cancer (1) Paternal Grandmother            Current Outpatient Medications   Medication Sig     doxylamine (UNISOM) 25 MG TABS tablet Take 25 mg by mouth At Bedtime     Prenat w/o C-SN-Qruxswq-FA-DHA (PNV-DHA PO)      pyridOXINE (VITAMIN B-6) 50 MG tablet Take 50 mg by mouth daily     No current facility-administered medications for this visit.     Allergies   Allergen Reactions     Erythromycin Rash     As a child (unsure if it was azithromycin or erythromycin)         ROS:  12 point review of systems negative other than symptoms noted below.    OBJECTIVE:     /66   Ht 1.651 m (5' 5\")   Wt 69.8 kg (153 lb 12.8 oz)   LMP 01/06/2022   Breastfeeding No   BMI 25.59 kg/m    Body mass index is 25.59 kg/m .    PHYSICAL EXAM:  Constitutional:  Appearance: Well nourished, well developed alert, in no acute distress     In-Clinic Test Results:  Results for orders placed or performed in visit on 03/24/22 (from the past 24 hour(s))   US OB Transvaginal Only ([id number])    Narrative    US OB Transvaginal Only ([id number])  Order #: [id number] Accession #: [id number]      Study Notes       Shadia Lopez on 3/24/2022  9:26 AM      [facility]  ULTRASOUND - OB < 14 Weeks- Transvaginal     Referring Provider: Alyson Cannon     ====================================  INDICATIONS FOR ULTRASOUND:  OB History:   Present Conditions: Initial S&D (0-17 weeks)     CLINICAL INFORMATION     LMP: 06 Jan 2022 - unsure EDC: 13 Oct 2022   EGA: 11 w 0 d                     Impression                    ===================     MEASUREMENTS  Gest Sac: vis        Position:nl    Contour:irregular.   Yolk sac: 3.9 mm wnl  CRL: 1.05 cm.      EGA:  7w 1d    U/S EDC: 09 Nov 2022 discrepancy  Cardiac Activity:No          Rt Ov L: 3.8 x 3.4 x 2.4 cm   Simple cyst 2.2 x 1.8 x 2.3 cm  Lt Ov L: 1.2 x 1.6 x 2.6 cm    Wnl  Cul de sac: free fluid - 1.3 x 0.8 cm     *Other Findings: "      ======================================  Impression: non viable intrauterine pregnancy with absent fetal heart beat   and measuring only 7w1d  Small simple left ovarian cyst 2.2 cm  Hellen Hurtado MD                 ASSESSMENT/PLAN:                                                        ICD-10-CM    1. Encounter for supervision of low-risk pregnancy in first trimester  Z34.91    2. Pregnancy with inconclusive fetal viability, single or unspecified fetus  O36.80X0 HCG quantitative pregnancy     US OB Transvaginal Only     HCG quantitative pregnancy       COUNSELING:  - Advised of non-viable IUP findings based on 11 week gestation, that we typically do see fetal cardiac activity at 7 weeks.  - Discussed accuracy of LMP and pregnancy dating, based on positive pregnancy test on 3/12 and irregularity of cycles and possible dates of conception, pregnancy could be closer to 7 weeks  - Offered repeat US in 1 week to assess growth and cardiac activity, as well as serial HCGs drawn today and Monday 3/28; patient accepts, HCG drawn today, orders placed for future lab and US. Depending on Hcg results will determine if US will be needed or not.   - Provided information about management strategies (expectant mgmt, medication mgmt, or D&C) for early pregnancy loss for her to consider in case this is the result of the above testing, handouts given.   - Advised to call midwives at the clinic phone number with any questions or concerns, or if she begins experiencing signs or symptoms of miscarriage.   - Discussed warning signs and when to be seen in ED, including fever or saturating a pad an hour for two hours.   - Will report lab findings as they come in a develop a management plan based on this information.        30 minutes spent on the date of the encounter doing chart review, review of outside records, review of test results, interpretation of tests, patient visit, documentation and discussion with family     Winnie Lambert,  Student Nurse Midwife  [facility]    I was present with the student who participated in the service and in the documentation of the note. I saw and evaluated the patient and agree with the findings and plan of care as documented in the note.    Alyson MEDINA CNM

## 2022-03-24 NOTE — TELEPHONE ENCOUNTER
11w0d   Ultrasound tech calling from [facility].    Baby measuring 7w1d  No heartbeat noted on scan.    Will update ORLIN Cannon CNM.  Tarah Bailey RN on 3/24/2022 at 9:27 AM

## 2022-03-25 LAB — B-HCG SERPL-ACNC: ABNORMAL IU/L (ref 0–5)

## 2022-03-25 NOTE — RESULT ENCOUNTER NOTE
Informed of hcg level via MyChart. Had US yesterday that showed non-viable pregnancy, no bleeding/spotting or cramping as of yesterday. Plan discussed yesterday with CNM in clinic for repeat hcg on Monday and repeat US next Thursday which is scheduled.     ADAM Gutierrez, RUSTY

## 2022-03-28 ENCOUNTER — LAB (OUTPATIENT)
Dept: LAB | Facility: CLINIC | Age: 32
End: 2022-03-28
Payer: COMMERCIAL

## 2022-03-28 DIAGNOSIS — O36.80X0 PREGNANCY WITH INCONCLUSIVE FETAL VIABILITY, SINGLE OR UNSPECIFIED FETUS: ICD-10-CM

## 2022-03-28 PROCEDURE — 84702 CHORIONIC GONADOTROPIN TEST: CPT

## 2022-03-28 PROCEDURE — 36415 COLL VENOUS BLD VENIPUNCTURE: CPT

## 2022-03-29 LAB — B-HCG SERPL-ACNC: ABNORMAL IU/L (ref 0–5)

## 2022-03-30 ENCOUNTER — TELEPHONE (OUTPATIENT)
Dept: OBGYN | Facility: CLINIC | Age: 32
End: 2022-03-30
Payer: COMMERCIAL

## 2022-03-30 ENCOUNTER — OFFICE VISIT (OUTPATIENT)
Dept: OBGYN | Facility: CLINIC | Age: 32
End: 2022-03-30
Payer: COMMERCIAL

## 2022-03-30 ENCOUNTER — ALLIED HEALTH/NURSE VISIT (OUTPATIENT)
Dept: NURSING | Facility: CLINIC | Age: 32
End: 2022-03-30
Payer: COMMERCIAL

## 2022-03-30 ENCOUNTER — TELEPHONE (OUTPATIENT)
Dept: OBGYN | Facility: CLINIC | Age: 32
End: 2022-03-30

## 2022-03-30 DIAGNOSIS — O03.9 MISCARRIAGE: Primary | ICD-10-CM

## 2022-03-30 DIAGNOSIS — O03.4 INCOMPLETE MISCARRIAGE: Primary | ICD-10-CM

## 2022-03-30 LAB — SARS-COV-2 RNA RESP QL NAA+PROBE: NEGATIVE

## 2022-03-30 PROCEDURE — 99207 PR NO CHARGE NURSE ONLY: CPT

## 2022-03-30 PROCEDURE — 99213 OFFICE O/P EST LOW 20 MIN: CPT | Performed by: OBSTETRICS & GYNECOLOGY

## 2022-03-30 PROCEDURE — U0003 INFECTIOUS AGENT DETECTION BY NUCLEIC ACID (DNA OR RNA); SEVERE ACUTE RESPIRATORY SYNDROME CORONAVIRUS 2 (SARS-COV-2) (CORONAVIRUS DISEASE [COVID-19]), AMPLIFIED PROBE TECHNIQUE, MAKING USE OF HIGH THROUGHPUT TECHNOLOGIES AS DESCRIBED BY CMS-2020-01-R: HCPCS

## 2022-03-30 PROCEDURE — U0005 INFEC AGEN DETEC AMPLI PROBE: HCPCS

## 2022-03-30 NOTE — TELEPHONE ENCOUNTER
LMP 1/6/22    U/s showed no FHT on 3/24/22, measuring 7w1d.  HCG levels dropping.    hCG Quantitative   Date Value Ref Range Status   03/28/2022 114,332 (H) 0 - 5 IU/L Final     Comment:     Adult:0-5 IU/L for healthy non-pregnant person  Neonates:Should be within normal ranges by 2 days after birth     States blood type is O pos    No bleeding or cramping at all. Pt wanting to do D&C. Okay to order?      Irma BOSTON RN BSN

## 2022-03-30 NOTE — TELEPHONE ENCOUNTER
Type of surgery: suction d&c  Location of surgery: [facility]  Date and time of surgery: 4/1/22 @ 12:00 pm  Surgeon: Dr. Atul Frey  Pre-Op Appt Date: day of surgery  Post-Op Appt Date:    Packet sent out: Yes  Pre-cert/Authorization completed:  No  Date: 3/30/22

## 2022-03-31 NOTE — PROGRESS NOTES
"  SUBJECTIVE:                                                   CC:  Patient presents with:  Consult      HPI:  Edna Ayala is a 31 year old  at 11w6d by LMP diagnosed with with missed AB, measuring 7w with no FHT who presents for pre op H&P for suction D&C scheduled for .      Unplanned pregnancy, it has caused some anxiety as she recently  from FOB.  Interested in surgical mgmt.  Mom is CNM at [facility].     PSH:   No prior surgeries.      Gyn history:  First pregnancy. Remote h/o chlamydia.  Also h/o recurrent BV.      Gyn History:  Patient's last menstrual period was 2022.        Last 3 Pap and HPV Results:  NILM 10/8/2020    PMH, PSH, Soc Hx, Fam Hx, Meds, and allergies reviewed in Epic.    OBJECTIVE:       Most recent vitals on 3/24/22   /66   Ht 1.651 m (5' 5\")   Wt 69.8 kg (153 lb 12.8 oz)   LMP 2022   Breastfeeding No   BMI 25.59 kg/m      Gen: Healthy appearing thin female, no acute distress, comfortable  Psych: mentation appears normal and affect bright/slightly anxious appropriate to situation  Heart: RRR no m/g/r  Lungs CTAB  : deferred    Test Results:  Shadia Lopez on 3/24/2022  9:26 AM      [facility]  ULTRASOUND - OB < 14 Weeks- Transvaginal     Referring Provider: Alyson Cannon     ====================================  INDICATIONS FOR ULTRASOUND:  OB History:   Present Conditions: Initial S&D (0-17 weeks)     CLINICAL INFORMATION     LMP: 2022 - unsure EDC: 13 Oct 2022   EGA: 11 w 0 d                                                                                                ===================     MEASUREMENTS  Gest Sac: vis        Position:nl    Contour:irregular.   Yolk sac: 3.9 mm wnl  CRL: 1.05 cm.      EGA:  7w 1d    U/S EDC: 2022 discrepancy  Cardiac Activity:No          Rt Ov L: 3.8 x 3.4 x 2.4 cm   Simple cyst 2.2 x 1.8 x 2.3 cm  Lt Ov L: 1.2 x 1.6 x 2.6 cm    Wnl  Cul de sac: free fluid " - 1.3 x 0.8 cm     *Other Findings:      ======================================  Impression: non viable intrauterine pregnancy with absent fetal heart beat and measuring only 7w1d  Small simple left ovarian cyst 2.2 cm       Component      Latest Ref Rng & Units 3/24/2022 3/28/2022   HCG Quantitative Serum      0 - 5 IU/L 120,855 (H) 114,332 (H)       ASSESSMENT/PLAN:                                                      1. Incomplete miscarriage  Desires D&C.  Scheduled for Friday at [facility].  Plan for covid test today, type and screen + CBC day of procedure. Planning Suction D&C with US guidance.  Discussed risks of surgery including bleeding to the point of requiring blood transfusion, infection, injury to surrounding organs, uterine perforation, or scarring.  Discussed recovery expectations and that this is typically a same-day surgery.  Pt has had all questions answered and has agreed to proceed.  This will constitute her pre op H&P, she is cleared for surgery.  Will sign consent form day of the procedure.      Lisandra Frey MD, MPH  Obstetrics and Gynecology

## 2022-04-01 ENCOUNTER — LAB REQUISITION (OUTPATIENT)
Dept: LAB | Facility: CLINIC | Age: 32
End: 2022-04-01
Payer: COMMERCIAL

## 2022-04-01 ENCOUNTER — HOSPITAL (OUTPATIENT)
Dept: OBGYN | Facility: CLINIC | Age: 32
End: 2022-04-01

## 2022-04-01 PROCEDURE — 88305 TISSUE EXAM BY PATHOLOGIST: CPT | Mod: TC,ORL | Performed by: OBSTETRICS & GYNECOLOGY

## 2022-04-01 NOTE — PROGRESS NOTES
Operative Note - Suction D&C    Patient: Edna Ayala  : 1990  MRN: [id number]    Date of Service: 2022    Pre-operative diagnosis: missed AB    Post-operative diagnosis: same    Procedure: Suction dilation and curettage under ultrasound guidance    Surgeon: Lisandra Frey MD    Anesthesia: General  EBL: 75cc  Urine: 100  Fluids: 500cc    Specimens: products of conception  Complications: none    Findings: Uterus dilated to 7cmm    Indications: Edna Ayala is a 31 year old  with a missed AB measuring 7wga.  She was counseled on options for mgmt and desired definitive surgical mgmt.  She was counseled regarding the risks and alternatives of suction D&C, including perforation and scarring. The patient agreed to proceed, and signed written informed consent.     Procedure: The patient was taken to the operating room where she was placed in the dorsal lithotomy position. Sedation was administered and the patient was prepped and draped in the usual sterile fashion. A speculum was inserted into the vagina and the cervix visualized. A single-toothed tenaculum was placed at 12 o'clock on the cervix. The cervix was dilated using Hegar dilators up to 7 mm. A 7 mm curved suction curet was inserted through the cervix to the uterine fundus and suction applied to 50 PSI. The curet was rotated in the uterine cavity with return of tissue. This process was repeated 3 times. A sharp curet was used on all aspects of the uterine cavity with minimal return of tissue and a gritty texture throughout. The US was used to visualize the uterus and one final pass of the uterus was done under direct visualization and the uterus was noted to be empty of tissue.  The suction contents were sent to pathology for examination. The tenaculum was removed from the cervix and good hemostasis noted after silver nitrate was applied on the cervix. The speculum was removed from the vagina. The patient was given IV methergine,  TXA, and doxycycline during the procedure.  The patient tolerated the procedure well and was taken to the recovery room in stable condition.     Lisandra Frey MD, MPH  Obstetrics and Gynecology

## 2022-04-03 ENCOUNTER — LAB (OUTPATIENT)
Dept: LAB | Facility: CLINIC | Age: 32
End: 2022-04-03
Payer: COMMERCIAL

## 2022-04-03 DIAGNOSIS — O03.4 INCOMPLETE MISCARRIAGE: ICD-10-CM

## 2022-04-03 DIAGNOSIS — Z01.812 PRE-OPERATIVE LABORATORY EXAMINATION: Primary | ICD-10-CM

## 2022-04-03 LAB
ABO/RH(D): NORMAL
ABO/RH(D): NORMAL
ANTIBODY SCREEN: NEGATIVE
SPECIMEN EXPIRATION DATE: NORMAL
SPECIMEN EXPIRATION DATE: NORMAL

## 2022-04-03 PROCEDURE — 36415 COLL VENOUS BLD VENIPUNCTURE: CPT

## 2022-04-03 PROCEDURE — 86901 BLOOD TYPING SEROLOGIC RH(D): CPT

## 2022-04-03 PROCEDURE — 36415 COLL VENOUS BLD VENIPUNCTURE: CPT | Performed by: OBSTETRICS & GYNECOLOGY

## 2022-04-03 PROCEDURE — 86901 BLOOD TYPING SEROLOGIC RH(D): CPT | Mod: 91 | Performed by: OBSTETRICS & GYNECOLOGY

## 2022-04-04 LAB
PATH REPORT.COMMENTS IMP SPEC: NORMAL
PATH REPORT.FINAL DX SPEC: NORMAL
PATH REPORT.GROSS SPEC: NORMAL
PATH REPORT.MICROSCOPIC SPEC OTHER STN: NORMAL
PATH REPORT.RELEVANT HX SPEC: NORMAL
PHOTO IMAGE: NORMAL

## 2022-04-04 PROCEDURE — 88305 TISSUE EXAM BY PATHOLOGIST: CPT | Mod: 26 | Performed by: PATHOLOGY

## 2022-04-11 ENCOUNTER — NURSE TRIAGE (OUTPATIENT)
Dept: NURSING | Facility: CLINIC | Age: 32
End: 2022-04-11
Payer: COMMERCIAL

## 2022-04-11 ENCOUNTER — VIRTUAL VISIT (OUTPATIENT)
Dept: INTERNAL MEDICINE | Facility: CLINIC | Age: 32
End: 2022-04-11
Payer: COMMERCIAL

## 2022-04-11 ENCOUNTER — ALLIED HEALTH/NURSE VISIT (OUTPATIENT)
Dept: INTERNAL MEDICINE | Facility: CLINIC | Age: 32
End: 2022-04-11

## 2022-04-11 DIAGNOSIS — J02.0 STREPTOCOCCAL SORE THROAT: Primary | ICD-10-CM

## 2022-04-11 DIAGNOSIS — J02.9 ACUTE PHARYNGITIS, UNSPECIFIED ETIOLOGY: Primary | ICD-10-CM

## 2022-04-11 PROBLEM — F41.9 ANXIETY: Status: ACTIVE | Noted: 2022-04-11

## 2022-04-11 PROBLEM — O09.90 SUPERVISION OF HIGH-RISK PREGNANCY: Status: RESOLVED | Noted: 2022-03-17 | Resolved: 2022-04-11

## 2022-04-11 LAB
DEPRECATED S PYO AG THROAT QL EIA: NEGATIVE
GROUP A STREP BY PCR: NOT DETECTED

## 2022-04-11 PROCEDURE — 99207 PR NO CHARGE NURSE ONLY: CPT

## 2022-04-11 PROCEDURE — 99213 OFFICE O/P EST LOW 20 MIN: CPT | Mod: TEL | Performed by: INTERNAL MEDICINE

## 2022-04-11 PROCEDURE — 87651 STREP A DNA AMP PROBE: CPT

## 2022-04-11 ASSESSMENT — ENCOUNTER SYMPTOMS
CONSTITUTIONAL NEGATIVE: 1
SORE THROAT: 1
MUSCULOSKELETAL NEGATIVE: 1
GASTROINTESTINAL NEGATIVE: 1
CARDIOVASCULAR NEGATIVE: 1
RHINORRHEA: 1
RESPIRATORY NEGATIVE: 1

## 2022-04-11 NOTE — PROGRESS NOTES
"Edna is a 31 year old who is being evaluated via a billable telephone visit.      What phone number would you like to be contacted at? [phone]  How would you like to obtain your AVS? MyChart    Assessment & Plan     Acute pharyngitis, unspecified etiology  At this time, patient will have future lab orders for a rapid streptococcal test to be completed.  She will present to the lab at her earliest convenience for lab collection.  In the meantime, we did encourage aggressive hydration.  Patient reports that she unfortunately did suffer a miscarriage, she is no longer pregnant.  She can use Tylenol or ibuprofen as needed for any fever or discomfort.  We will contact her with results of her strep swab once it is available for review.  - Streptococcus A Rapid Scr w Reflx to PCR - Lab Collect; Future    Ordering of each unique test       BMI:   Estimated body mass index is 25.59 kg/m  as calculated from the following:    Height as of 3/24/22: 1.651 m (5' 5\").    Weight as of 3/24/22: 69.8 kg (153 lb 12.8 oz).       See Patient Instructions    No follow-ups on file.    Onur Cid MD  [facility]    Issac Bishop is a 31 year old who participates in a virtual visit for sore throat.    Patient is a 31-year-old female who presented to the virtual visit with a chief complaint of sore throat.  She reports that she has been dealing with a sore throat for approximately 3 to 4 days.  Her only other reported symptom is some mild rhinorrhea.  Patient denies any fever, chills, cough, or ear pain.  She has not taken any medication for management of her symptoms.  Patient has been fully vaccinated and boosted for Covid.  She does work as a schoolteacher, she states there have been some issues with kids having sore throat over the past few weeks.  Patient would like to arrange her strep test at this time.  She has also noted redness with white spots in the back of her throat.       Acute " "Illness  Acute illness concerns: sore throat  Onset/Duration: 2-3 days ago  Symptoms:  Fever: no  Chills/Sweats: no  Headache (location?): no  Sinus Pressure: no  Conjunctivitis:  no  Ear Pain: YES  Rhinorrhea: YES  Congestion: no  Sore Throat: YES  Cough: no  Wheeze: no  Decreased Appetite: YES  Nausea: no  Vomiting: no  Diarrhea: no  Dysuria/Freq.: no  Dysuria or Hematuria: no  Fatigue/Achiness: no  Sick/Strep Exposure: no  Therapies tried and outcome: None    TRIAGE NOTE:    Patient is calling asking for strep testing. Symptoms started 2-3 days ago. Throat pain is severe. Back of throat is red with white spots. Slight runny nose as well. No fever. 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     Review of Systems   Constitutional: Negative.    HENT: Positive for rhinorrhea and sore throat.    Respiratory: Negative.    Cardiovascular: Negative.    Gastrointestinal: Negative.    Genitourinary: Negative.    Musculoskeletal: Negative.    Skin: Negative.             Objective           Vitals:  No vitals were obtained today due to virtual visit.    Physical Exam   healthy, alert and no distress  PSYCH: Alert and oriented times 3; coherent speech, normal   rate and volume, able to articulate logical thoughts, able   to abstract reason, no tangential thoughts, no hallucinations   or delusions  Her affect is normal  RESP: No cough, no audible wheezing, able to talk in full sentences  Remainder of exam unable to be completed due to telephone visits    Diagnostic Test Results: Rapid streptococcal swab with reflex culture pending.        Phone call duration: 15 minutes  "

## 2022-04-11 NOTE — TELEPHONE ENCOUNTER
"Patient is calling asking for strep testing. Symptoms started 2-3 days ago. Throat pain is severe. Back of throat is red with white spots. Slight runny nose as well. No fever. 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     Protocol recommends in office visit today.   Care advice given. If no visits available in clinic patient will consider e-visit or walk in clinic.   Transferred to scheduling.   Jade Winston RN   04/11/22 8:21 AM  [facility] Nurse Advisor  ProtocolCOVID 19 Nurse Triage Plan/Patient Instructions    Please be aware that novel coronavirus (COVID-19) may be circulating in the community. If you develop symptoms such as fever, cough, or SOB or if you have concerns about the presence of another infection including coronavirus (COVID-19), please contact your health care provider or visit [email or web address].     Disposition/Instructions    In-Person Visit with provider recommended. Reference Visit Selection Guide.    Thank you for taking steps to prevent the spread of this virus.  o Limit your contact with others.  o Wear a simple mask to cover your cough.  o Wash your hands well and often.    Resources    [facility]: About COVID-19: [email or web address]    CDC: What to Do If You're Sick: www.cdc.gov/coronavirus/2019-ncov/about/steps-when-sick.html    CDC: Ending Home Isolation: www.cdc.gov/coronavirus/2019-ncov/hcp/disposition-in-home-patients.html     CDC: Caring for Someone: www.cdc.gov/coronavirus/2019-ncov/if-you-are-sick/care-for-someone.html     [facility]: Interim Guidance for Hospital Discharge to Home: www.health.[facility].[place]/diseases/coronavirus/hcp/hospdischarge.pdf    [facility] clinical trials (COVID-19 research studies): [email or web address]     Below are the COVID-19 hotlines at the [place] [facility] ([facility]). Interpreters are available.   o For health questions: Call 260-169-1426 or 1-413.118.7504 (7 " a.m. to 7 p.m.)  o For questions about schools and childcare: Call 822-675-3112 or 1-811.970.8312 (7 a.m. to 7 p.m.)     Reason for Disposition    SEVERE sore throat pain    Additional Information    Negative: Severe difficulty breathing (e.g., struggling for each breath, speaks in single words)    Negative: Sounds like a life-threatening emergency to the triager    Negative: Throat culture results, call about    Negative: Productive cough is the main symptom    Negative: Runny nose is the main symptom    Negative: Drooling or spitting out saliva (because can't swallow)    Negative: Unable to open mouth completely    Negative: Drinking very little and has signs of dehydration (e.g., no urine > 12 hours, very dry mouth, very lightheaded)    Negative: Patient sounds very sick or weak to the triager    Negative: Difficulty breathing (per caller) but not severe    Negative: Fever > 103 F (39.4 C)    Negative: Refuses to drink anything for > 12 hours    Protocols used: SORE THROAT-A-OH

## 2022-09-24 ENCOUNTER — HEALTH MAINTENANCE LETTER (OUTPATIENT)
Age: 32
End: 2022-09-24

## 2023-06-04 ENCOUNTER — HEALTH MAINTENANCE LETTER (OUTPATIENT)
Age: 33
End: 2023-06-04

## 2024-07-14 ENCOUNTER — HEALTH MAINTENANCE LETTER (OUTPATIENT)
Age: 34
End: 2024-07-14